# Patient Record
Sex: MALE | URBAN - METROPOLITAN AREA
[De-identification: names, ages, dates, MRNs, and addresses within clinical notes are randomized per-mention and may not be internally consistent; named-entity substitution may affect disease eponyms.]

---
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
## 2022-12-12 NOTE — PROCEDURE: ISOTRETINOIN INITIATION
Constitutional: No fever. No chills.  Eyes: No redness. No discharge. No vision change.   ENT: No sore throat. No ear pain.  MSK: No joint pain. No back pain.   Skin: No rash. No abrasions. +laceration.   Neuro: No numbness. No weakness.   Psych: No known mental health issues.
Detail Level: Simple
Ipledge Number (Optional): [id number]